# Patient Record
Sex: FEMALE | Race: OTHER | NOT HISPANIC OR LATINO
[De-identification: names, ages, dates, MRNs, and addresses within clinical notes are randomized per-mention and may not be internally consistent; named-entity substitution may affect disease eponyms.]

---

## 2018-05-09 PROBLEM — Z00.00 ENCOUNTER FOR PREVENTIVE HEALTH EXAMINATION: Status: ACTIVE | Noted: 2018-05-09

## 2018-05-17 ENCOUNTER — RECORD ABSTRACTING (OUTPATIENT)
Age: 53
End: 2018-05-17

## 2018-05-17 DIAGNOSIS — Z78.9 OTHER SPECIFIED HEALTH STATUS: ICD-10-CM

## 2018-05-17 DIAGNOSIS — Z80.8 FAMILY HISTORY OF MALIGNANT NEOPLASM OF OTHER ORGANS OR SYSTEMS: ICD-10-CM

## 2018-05-17 DIAGNOSIS — Z82.0 FAMILY HISTORY OF EPILEPSY AND OTHER DISEASES OF THE NERVOUS SYSTEM: ICD-10-CM

## 2018-05-17 RX ORDER — BACILLUS COAGULANS/INULIN 1B-250 MG
CAPSULE ORAL
Refills: 0 | Status: ACTIVE | COMMUNITY

## 2018-05-17 RX ORDER — METHYLDOPA/HYDROCHLOROTHIAZIDE 250MG-15MG
TABLET ORAL
Refills: 0 | Status: ACTIVE | COMMUNITY

## 2018-05-17 RX ORDER — ANASTROZOLE 1 MG/1
1 TABLET ORAL DAILY
Refills: 0 | Status: ACTIVE | COMMUNITY

## 2018-06-01 ENCOUNTER — APPOINTMENT (OUTPATIENT)
Dept: PLASTIC SURGERY | Facility: CLINIC | Age: 53
End: 2018-06-01
Payer: COMMERCIAL

## 2018-06-01 VITALS
SYSTOLIC BLOOD PRESSURE: 104 MMHG | RESPIRATION RATE: 20 BRPM | WEIGHT: 140 LBS | DIASTOLIC BLOOD PRESSURE: 71 MMHG | HEIGHT: 66 IN | BODY MASS INDEX: 22.5 KG/M2 | HEART RATE: 77 BPM | TEMPERATURE: 97.7 F

## 2018-06-01 DIAGNOSIS — Z87.891 PERSONAL HISTORY OF NICOTINE DEPENDENCE: ICD-10-CM

## 2018-06-01 PROCEDURE — 99215 OFFICE O/P EST HI 40 MIN: CPT

## 2018-08-01 ENCOUNTER — APPOINTMENT (OUTPATIENT)
Dept: BREAST CENTER | Facility: CLINIC | Age: 53
End: 2018-08-01
Payer: MEDICAID

## 2018-08-01 VITALS
DIASTOLIC BLOOD PRESSURE: 58 MMHG | WEIGHT: 138 LBS | BODY MASS INDEX: 22.99 KG/M2 | HEART RATE: 68 BPM | HEIGHT: 65 IN | SYSTOLIC BLOOD PRESSURE: 114 MMHG

## 2018-08-01 DIAGNOSIS — R92.8 OTHER ABNORMAL AND INCONCLUSIVE FINDINGS ON DIAGNOSTIC IMAGING OF BREAST: ICD-10-CM

## 2018-08-01 DIAGNOSIS — Z87.898 PERSONAL HISTORY OF OTHER SPECIFIED CONDITIONS: ICD-10-CM

## 2018-08-01 DIAGNOSIS — D23.9 OTHER BENIGN NEOPLASM OF SKIN, UNSPECIFIED: ICD-10-CM

## 2018-08-01 PROCEDURE — 99214 OFFICE O/P EST MOD 30 MIN: CPT

## 2018-08-14 ENCOUNTER — APPOINTMENT (OUTPATIENT)
Dept: PLASTIC SURGERY | Facility: CLINIC | Age: 53
End: 2018-08-14

## 2018-09-07 ENCOUNTER — APPOINTMENT (OUTPATIENT)
Dept: PLASTIC SURGERY | Facility: CLINIC | Age: 53
End: 2018-09-07
Payer: MEDICAID

## 2018-09-07 VITALS
HEART RATE: 78 BPM | OXYGEN SATURATION: 100 % | RESPIRATION RATE: 18 BRPM | HEIGHT: 65 IN | BODY MASS INDEX: 24.16 KG/M2 | TEMPERATURE: 98.1 F | WEIGHT: 145 LBS | DIASTOLIC BLOOD PRESSURE: 78 MMHG | SYSTOLIC BLOOD PRESSURE: 107 MMHG

## 2018-09-07 PROCEDURE — 99213 OFFICE O/P EST LOW 20 MIN: CPT

## 2018-09-12 ENCOUNTER — APPOINTMENT (OUTPATIENT)
Dept: PLASTIC SURGERY | Facility: HOSPITAL | Age: 53
End: 2018-09-12
Payer: COMMERCIAL

## 2018-09-12 PROCEDURE — 19380 REVJ RECONSTRUCTED BREAST: CPT | Mod: LT,59

## 2018-09-12 PROCEDURE — 19316 MASTOPEXY: CPT | Mod: LT

## 2018-09-12 PROCEDURE — 20926: CPT | Mod: 59

## 2018-09-18 ENCOUNTER — APPOINTMENT (OUTPATIENT)
Dept: PLASTIC SURGERY | Facility: CLINIC | Age: 53
End: 2018-09-18
Payer: MEDICAID

## 2018-09-18 VITALS
WEIGHT: 145 LBS | HEART RATE: 71 BPM | HEIGHT: 66 IN | RESPIRATION RATE: 20 BRPM | DIASTOLIC BLOOD PRESSURE: 74 MMHG | BODY MASS INDEX: 23.3 KG/M2 | OXYGEN SATURATION: 100 % | TEMPERATURE: 98.4 F | SYSTOLIC BLOOD PRESSURE: 119 MMHG

## 2018-09-18 PROCEDURE — 99024 POSTOP FOLLOW-UP VISIT: CPT

## 2018-10-16 ENCOUNTER — APPOINTMENT (OUTPATIENT)
Dept: PLASTIC SURGERY | Facility: CLINIC | Age: 53
End: 2018-10-16
Payer: MEDICAID

## 2018-10-16 VITALS
BODY MASS INDEX: 23.3 KG/M2 | DIASTOLIC BLOOD PRESSURE: 58 MMHG | HEIGHT: 66 IN | TEMPERATURE: 97.8 F | WEIGHT: 145 LBS | OXYGEN SATURATION: 99 % | SYSTOLIC BLOOD PRESSURE: 100 MMHG | HEART RATE: 67 BPM | RESPIRATION RATE: 18 BRPM

## 2018-10-16 PROCEDURE — 99024 POSTOP FOLLOW-UP VISIT: CPT

## 2018-11-14 ENCOUNTER — APPOINTMENT (OUTPATIENT)
Dept: BREAST CENTER | Facility: CLINIC | Age: 53
End: 2018-11-14
Payer: COMMERCIAL

## 2018-11-14 VITALS
BODY MASS INDEX: 23.9 KG/M2 | HEIGHT: 64 IN | SYSTOLIC BLOOD PRESSURE: 101 MMHG | HEART RATE: 71 BPM | WEIGHT: 140 LBS | DIASTOLIC BLOOD PRESSURE: 57 MMHG

## 2018-11-14 DIAGNOSIS — M65.311 TRIGGER THUMB, RIGHT THUMB: ICD-10-CM

## 2018-11-14 PROCEDURE — 99214 OFFICE O/P EST MOD 30 MIN: CPT

## 2018-11-14 RX ORDER — ASPIRIN 81 MG
81 TABLET, DELAYED RELEASE (ENTERIC COATED) ORAL
Refills: 0 | Status: ACTIVE | COMMUNITY

## 2018-11-14 RX ORDER — LEUPROLIDE ACETATE 7.5 MG
KIT INTRAMUSCULAR
Refills: 0 | Status: ACTIVE | COMMUNITY

## 2019-04-11 ENCOUNTER — APPOINTMENT (OUTPATIENT)
Dept: BREAST CENTER | Facility: CLINIC | Age: 54
End: 2019-04-11
Payer: COMMERCIAL

## 2019-04-11 VITALS
DIASTOLIC BLOOD PRESSURE: 71 MMHG | BODY MASS INDEX: 25.61 KG/M2 | HEART RATE: 58 BPM | WEIGHT: 150 LBS | SYSTOLIC BLOOD PRESSURE: 119 MMHG | HEIGHT: 64 IN

## 2019-04-11 PROCEDURE — 99214 OFFICE O/P EST MOD 30 MIN: CPT

## 2019-04-11 NOTE — PHYSICAL EXAM
[Normocephalic] : normocephalic [Atraumatic] : atraumatic [Supple] : supple [No Supraclavicular Adenopathy] : no supraclavicular adenopathy [No Cervical Adenopathy] : no cervical adenopathy [No Thyromegaly] : no thyromegaly [Normal Sinus Rhythm] : normal sinus rhythm [Examined in the supine and seated position] : examined in the supine and seated position [No dominant masses] : no dominant masses in right breast  [No dominant masses] : no dominant masses left breast [No Nipple Retraction] : no left nipple retraction [No Nipple Discharge] : no left nipple discharge [No Axillary Lymphadenopathy] : no left axillary lymphadenopathy [No Edema] : no edema [No Rashes] : no rashes [No Ulceration] : no ulceration [de-identified] : S/P NSMX/Implt w/o rec. %. No lymphedema. \par   [de-identified] : S/P NSMX/SLN/IAx/PMRT/Implt w/o rec. %. No lymphedema. \par

## 2019-04-11 NOTE — HISTORY OF PRESENT ILLNESS
[FreeTextEntry1] : S/P Bilat NSMX/ R SLN/ R AX/TE's ([facility])(10/10/16): R: +ILCA x 2 sites (2.5cm, 0.8cm), +LVI, +3/10 LN, +extranodal extension, -margins, ER+, [place]+, Her2 1+, Ki67 29%/16%, L: No Ca/atypia\par R Stage IIB (T2N1M0) ILCA\par Completed chemo (AC/T)([facility])(3/17)\par S/P R PMRT ([facility])\par S/P Bilat Implt Xchg (1/12/18)([facility])\par S/P L SIP (11/14/16)([facility]) > d/c'ed (4/17)\par Started Arimidex/Lupron (8/17)\par On Zometa q6mos\par +FH Br Ca (M. Gr Aunt 40)\par BRCA ([facility])(8/16): -\par +c/o bilat trigger finger in thumbs (R>L) > s/p steroid inj R and L\par S/P L and R thumb surgery for trigger finger release\par S/P L Mx Site revision/fat injection (9/18)([facility])\par No other MH/FH changes. ROS reviewed/discussed. Taking Vit D. Bone Densitometry (7/17): GEE

## 2019-09-12 ENCOUNTER — APPOINTMENT (OUTPATIENT)
Dept: BREAST CENTER | Facility: CLINIC | Age: 54
End: 2019-09-12
Payer: MEDICAID

## 2019-09-12 VITALS
BODY MASS INDEX: 25.61 KG/M2 | DIASTOLIC BLOOD PRESSURE: 71 MMHG | SYSTOLIC BLOOD PRESSURE: 107 MMHG | WEIGHT: 150 LBS | HEART RATE: 62 BPM | HEIGHT: 64 IN

## 2019-09-12 PROCEDURE — 99214 OFFICE O/P EST MOD 30 MIN: CPT

## 2019-09-12 NOTE — HISTORY OF PRESENT ILLNESS
[FreeTextEntry1] : S/P Bilat NSMX/ R SLN/ R Ax/TE's ([facility])(10/10/16): R: +ILCA x 2 sites (2.5cm, 0.8cm), +LVI, +3/10 LN, +extranodal extension, -margins, ER+, [place]+, Her2 1+, Ki67 29%/16%, L: No Ca/atypia\par R Stage IIB (T2N1M0) ILCA\par Completed chemo (AC/T)([facility])(3/17)\par S/P R PMRT ([facility])\par S/P Bilat Implt Xchg (1/12/18)([facility])\par S/P L SIP (11/14/16)([facility]) > d/c'ed (4/17)\par Started Arimidex/Lupron (8/17)\par On Zometa q6mos\par +FH Br Ca (M. Gr Aunt 40)\par BRCA ([facility])(8/16): -\par +c/o bilat trigger finger in thumbs (R>L) > s/p steroid inj R and L\par S/P L and R thumb surgery for trigger finger release\par S/P L Mx Site revision/fat injection (9/18)([facility])\par No other MH/FH changes. ROS reviewed/discussed. Taking Vit D. Bone Densitometry (7/17): GEE

## 2019-09-12 NOTE — PHYSICAL EXAM
[Normocephalic] : normocephalic [Atraumatic] : atraumatic [No Cervical Adenopathy] : no cervical adenopathy [No Supraclavicular Adenopathy] : no supraclavicular adenopathy [Supple] : supple [No Thyromegaly] : no thyromegaly [Normal Sinus Rhythm] : normal sinus rhythm [Examined in the supine and seated position] : examined in the supine and seated position [No dominant masses] : no dominant masses in right breast  [No dominant masses] : no dominant masses left breast [No Nipple Retraction] : no right nipple retraction [No Nipple Discharge] : no right nipple discharge [No Axillary Lymphadenopathy] : no right axillary lymphadenopathy [No Edema] : no edema [No Rashes] : no rashes [No Ulceration] : no ulceration [de-identified] : S/P NSMX/SLN/Ax/Implt w/o rec. %. No lymphedema. \par +caps contr.\par   [de-identified] : S/P NSMX/Implt w/o rec. %. No lymphedema. \par

## 2019-10-15 ENCOUNTER — APPOINTMENT (OUTPATIENT)
Dept: PLASTIC SURGERY | Facility: CLINIC | Age: 54
End: 2019-10-15
Payer: COMMERCIAL

## 2019-10-15 VITALS
BODY MASS INDEX: 23.63 KG/M2 | OXYGEN SATURATION: 100 % | TEMPERATURE: 98.8 F | RESPIRATION RATE: 20 BRPM | SYSTOLIC BLOOD PRESSURE: 112 MMHG | HEIGHT: 66 IN | WEIGHT: 147 LBS | DIASTOLIC BLOOD PRESSURE: 68 MMHG | HEART RATE: 79 BPM

## 2019-10-15 PROCEDURE — 99214 OFFICE O/P EST MOD 30 MIN: CPT

## 2019-10-15 NOTE — HISTORY OF PRESENT ILLNESS
[FreeTextEntry1] : pt is s/p r breast cancer bilateral mastectomy with rt to r side with change of implants 2018 for [facility]+  textured.  pt already aware of reports of alcl prior to implantation she denies change in status and notes firm contracture r side .  pt has no evidence of  fluid or other alarming issue aside from radiation induced contracture   she sees dr tapia on a regular basis and sees me annually  disc ultrasound periodically for surveillance  do not recommend removal and pt aware of ALCL

## 2019-10-15 NOTE — ASSESSMENT
[FreeTextEntry1] : pt with excellent shape and symmetry with minor issues of irregular shape but overall excellent appearance after bilateral mastectomy nipple sparing  rto annually for check  pt does not want to electively change out textured for smooth as she was aware preoperatively. she will rto prn change sooner than 1 year

## 2019-10-15 NOTE — PHYSICAL EXAM
[NI] : Normal [de-identified] : Status post bilateral mastectomy and implant reconstruction, right side has a grade 2 contracture and the left side is softer and lower with rippling laterally

## 2020-01-27 ENCOUNTER — APPOINTMENT (OUTPATIENT)
Dept: BREAST CENTER | Facility: CLINIC | Age: 55
End: 2020-01-27
Payer: MEDICAID

## 2020-01-27 VITALS
HEIGHT: 65 IN | HEART RATE: 70 BPM | SYSTOLIC BLOOD PRESSURE: 101 MMHG | BODY MASS INDEX: 31.32 KG/M2 | DIASTOLIC BLOOD PRESSURE: 64 MMHG | WEIGHT: 188 LBS

## 2020-01-27 PROCEDURE — 99214 OFFICE O/P EST MOD 30 MIN: CPT

## 2020-01-27 RX ORDER — OXYCODONE AND ACETAMINOPHEN 5; 325 MG/1; MG/1
5-325 TABLET ORAL
Qty: 40 | Refills: 0 | Status: DISCONTINUED | COMMUNITY
Start: 2018-01-12 | End: 2020-01-27

## 2020-01-27 RX ORDER — CEPHALEXIN 500 MG/1
500 CAPSULE ORAL
Qty: 20 | Refills: 0 | Status: COMPLETED | COMMUNITY
Start: 2018-01-12 | End: 2020-01-27

## 2020-01-27 RX ORDER — AMOXICILLIN AND CLAVULANATE POTASSIUM 875; 125 MG/1; MG/1
875-125 TABLET, COATED ORAL
Qty: 20 | Refills: 0 | Status: COMPLETED | COMMUNITY
Start: 2018-03-16 | End: 2020-01-27

## 2020-01-27 RX ORDER — AMOXICILLIN 500 MG/1
500 CAPSULE ORAL
Refills: 0 | Status: COMPLETED | COMMUNITY
End: 2020-01-27

## 2020-01-27 RX ORDER — GRAPE SEED OIL 100 %
OIL (ML) MISCELLANEOUS
Refills: 0 | Status: DISCONTINUED | COMMUNITY
End: 2020-01-27

## 2020-01-27 NOTE — PHYSICAL EXAM
[Normocephalic] : normocephalic [Atraumatic] : atraumatic [Supple] : supple [No Supraclavicular Adenopathy] : no supraclavicular adenopathy [No Cervical Adenopathy] : no cervical adenopathy [No Thyromegaly] : no thyromegaly [Normal Sinus Rhythm] : normal sinus rhythm [Examined in the supine and seated position] : examined in the supine and seated position [No dominant masses] : no dominant masses in right breast  [No dominant masses] : no dominant masses left breast [No Nipple Retraction] : no left nipple retraction [No Nipple Discharge] : no left nipple discharge [No Axillary Lymphadenopathy] : no left axillary lymphadenopathy [No Edema] : no edema [No Rashes] : no rashes [No Ulceration] : no ulceration [de-identified] : S/P NSMX/SLN/Ax/PMRT/Implt w/o rec. %. No lymphedema. \par +caps contr\par   [de-identified] : S/P NSMX/Implt w/o susp fx's. %. No lymphedema. \par

## 2020-01-27 NOTE — HISTORY OF PRESENT ILLNESS
[FreeTextEntry1] : S/P Bilat NSMX/ R SLN/ R Ax/TE's ([facility])(10/10/16): R: +ILCA x 2 sites (2.5cm, 0.8cm), +LVI, +3/10 LN, +extranodal extension, -margins, ER+, [place]+, Her2 1+, Ki67 29%/16%, L: No Ca/atypia\par R Stage IIB (T2N1M0) ILCA\par Completed chemo (AC/T)([facility])(3/17)\par S/P R PMRT ([facility])\par S/P Bilat Implt Xchg (1/12/18)([facility])\par S/P L SIP (11/14/16)([facility]) > d/c'ed (4/17)\par Started Arimidex/Lupron (8/17)\par On Zometa q6mos\par +FH Br Ca (M. Gr Aunt 38)\par BRCA ([facility])(8/16): -\par +c/o bilat trigger finger in thumbs (R>L) > s/p steroid inj R and L\par S/P L and R thumb surgery for trigger finger release\par S/P L Mx Site revision/fat injection (9/18)([facility])\par No other MH/FH changes. ROS reviewed/discussed. Taking Vit D. Bone Densitometry (7/17): GEE

## 2020-05-20 ENCOUNTER — APPOINTMENT (OUTPATIENT)
Dept: BREAST CENTER | Facility: CLINIC | Age: 55
End: 2020-05-20
Payer: MEDICAID

## 2020-05-20 VITALS — DIASTOLIC BLOOD PRESSURE: 69 MMHG | HEIGHT: 66 IN | SYSTOLIC BLOOD PRESSURE: 123 MMHG | HEART RATE: 63 BPM

## 2020-05-20 PROCEDURE — 99214 OFFICE O/P EST MOD 30 MIN: CPT

## 2020-05-20 NOTE — HISTORY OF PRESENT ILLNESS
[FreeTextEntry1] : S/P Bilat NSMX/ R SLN/ R Ax/TE's ([facility])(10/10/16): R: +ILCA x 2 sites (2.5cm, 0.8cm), +LVI, +3/10 LN, +extranodal extension, -margins, ER+, [place]+, Her2 1+, Ki67 29%/16%, L: No Ca/atypia\par R Stage IIB (T2N1M0) ILCA\par Completed chemo (AC/T)([facility])(3/17)\par S/P R PMRT ([facility])\par S/P Bilat Implt Xchg (textured > NOT Allerghan) (1/12/18)([facility])\par S/P L SIP (11/14/16)([facility]) > d/c'ed (4/17)\par Started Arimidex/Lupron (8/17)\par On Zometa q6mos\par +FH Br Ca (M. Gr Aunt 38)\par BRCA ([facility])(8/16): -\par +c/o bilat trigger finger in thumbs (R>L) > s/p steroid inj R and L\par S/P L and R thumb surgery for trigger finger release\par S/P L Mx Site revision/fat injection (9/18)([facility])\par No other MH/FH changes. ROS reviewed/discussed. Taking Vit D. Bone Densitometry (7/17): GEE

## 2020-05-20 NOTE — PHYSICAL EXAM
[Atraumatic] : atraumatic [Normocephalic] : normocephalic [Supple] : supple [No Supraclavicular Adenopathy] : no supraclavicular adenopathy [No Cervical Adenopathy] : no cervical adenopathy [No Thyromegaly] : no thyromegaly [Normal Sinus Rhythm] : normal sinus rhythm [No dominant masses] : no dominant masses in right breast  [Examined in the supine and seated position] : examined in the supine and seated position [No Nipple Retraction] : no left nipple retraction [No dominant masses] : no dominant masses left breast [No Nipple Discharge] : no right nipple discharge [No Axillary Lymphadenopathy] : no right axillary lymphadenopathy [No Edema] : no edema [No Ulceration] : no ulceration [No Rashes] : no rashes [de-identified] : S/P NSMX/SLN/Ax/Implt w/o rec. %. No lymphedema. \par   [de-identified] : S/P NSMX/Implt w/o susp fx's. %. No lymphedema. \par

## 2020-10-20 ENCOUNTER — APPOINTMENT (OUTPATIENT)
Dept: PLASTIC SURGERY | Facility: CLINIC | Age: 55
End: 2020-10-20
Payer: COMMERCIAL

## 2020-10-20 ENCOUNTER — APPOINTMENT (OUTPATIENT)
Dept: BREAST CENTER | Facility: CLINIC | Age: 55
End: 2020-10-20
Payer: MEDICAID

## 2020-10-20 VITALS
HEART RATE: 64 BPM | OXYGEN SATURATION: 98 % | TEMPERATURE: 98.4 F | SYSTOLIC BLOOD PRESSURE: 113 MMHG | DIASTOLIC BLOOD PRESSURE: 74 MMHG | RESPIRATION RATE: 16 BRPM

## 2020-10-20 VITALS
HEART RATE: 68 BPM | DIASTOLIC BLOOD PRESSURE: 74 MMHG | WEIGHT: 150 LBS | HEIGHT: 65 IN | BODY MASS INDEX: 24.99 KG/M2 | SYSTOLIC BLOOD PRESSURE: 114 MMHG

## 2020-10-20 DIAGNOSIS — Z85.3 PERSONAL HISTORY OF MALIGNANT NEOPLASM OF BREAST: ICD-10-CM

## 2020-10-20 PROCEDURE — 99214 OFFICE O/P EST MOD 30 MIN: CPT

## 2020-10-20 PROCEDURE — 99072 ADDL SUPL MATRL&STAF TM PHE: CPT

## 2020-10-20 NOTE — PHYSICAL EXAM
[Normocephalic] : normocephalic [Atraumatic] : atraumatic [Supple] : supple [No Supraclavicular Adenopathy] : no supraclavicular adenopathy [No Cervical Adenopathy] : no cervical adenopathy [No Thyromegaly] : no thyromegaly [Normal Sinus Rhythm] : normal sinus rhythm [Examined in the supine and seated position] : examined in the supine and seated position [No dominant masses] : no dominant masses in right breast  [No dominant masses] : no dominant masses left breast [No Nipple Retraction] : no left nipple retraction [No Nipple Discharge] : no left nipple discharge [No Axillary Lymphadenopathy] : no left axillary lymphadenopathy [No Edema] : no edema [No Rashes] : no rashes [No Ulceration] : no ulceration [de-identified] : S/P NSMX/SLN/Ax/PMRT/Implt w/o rec. %. No lymphedema. \par +caps contracture\par   [de-identified] : S/P NSMX/Implt w/o susp fx's. %. No lymphedema. \par

## 2020-10-20 NOTE — HISTORY OF PRESENT ILLNESS
[FreeTextEntry1] : S/P Bilat NSMX/ R SLN/ R Ax/TE's ([facility])(10/10/16): R: +ILCA x 2 sites (2.5cm, 0.8cm), +LVI, +3/10 LN, +extranodal extension, -margins, ER+, [place]+, Her2 1+, Ki67 29%/16%, L: No Ca/atypia\par R Stage IIB (T2N1M0) ILCA\par Completed chemo (AC/T)([facility])(3/17)\par S/P R PMRT ([facility])\par S/P Bilat Implt Xchg (textured > NOT Allerghan) (1/12/18)([facility])\par S/P L SIP (11/14/16)([facility]) > d/c'ed (4/17)\par Started Arimidex/Lupron/ASAb (8/17)\par On Zometa q6mos\par +FH Br Ca (M. Gr Aunt 38)\par BRCA ([facility])(8/16): -\par +c/o bilat trigger finger in thumbs (R>L) > s/p steroid inj R and L\par S/P L and R thumb surgery for trigger finger release\par S/P L Mx Site revision/fat injection (9/18)([facility])\par No other MH/FH changes. ROS reviewed/discussed. Taking Vit D. Bone Densitometry (7/17): GEE

## 2020-12-23 PROBLEM — Z85.3 HISTORY OF MALIGNANT NEOPLASM OF BREAST: Status: RESOLVED | Noted: 2018-05-17 | Resolved: 2020-12-23

## 2020-12-23 NOTE — PHYSICAL EXAM
[NI] : Normal [de-identified] : Status post bilateral mastectomy and implant reconstruction, right side has a grade 2 contracture and the left side is softer and lower with rippling laterally

## 2020-12-23 NOTE — HISTORY OF PRESENT ILLNESS
[FreeTextEntry1] : pt is here for annual visit after bilateral mastectomy reconstruction with implants  . no interval illness or issues . pt has adjusted to radiation contracture well and it has not progressed.

## 2020-12-23 NOTE — ASSESSMENT
[FreeTextEntry1] : pt is doing well with no recurrence and no change with grade 2 contracture on radiated side  grade 1 on contralateral side .  monitoring textured implants The risks, benefits, alternatives, limitations and the permanent scars were outlined with the patient.\par RAKESH will return to the office for a post procedure visit\par The instructions were reviewed in detail with RAKESH.\par

## 2021-02-08 ENCOUNTER — APPOINTMENT (OUTPATIENT)
Dept: BREAST CENTER | Facility: CLINIC | Age: 56
End: 2021-02-08
Payer: MEDICAID

## 2021-02-08 VITALS — BODY MASS INDEX: 24.11 KG/M2 | HEIGHT: 66 IN | WEIGHT: 150 LBS

## 2021-02-08 PROCEDURE — 99214 OFFICE O/P EST MOD 30 MIN: CPT

## 2021-02-08 PROCEDURE — 99072 ADDL SUPL MATRL&STAF TM PHE: CPT

## 2021-02-08 NOTE — HISTORY OF PRESENT ILLNESS
[FreeTextEntry1] : S/P Bilat NSMX/ R SLN/ R Ax/TE's ([facility])(10/10/16): R: +ILCA x 2 sites (2.5cm, 0.8cm), +LVI, +3/10 LN, +extranodal extension, -margins, ER+, [place]+, Her2 1+, Ki67 29%/16%, L: No Ca/atypia\par R Stage IIB (T2N1M0) ILCA\par Completed chemo (AC/T)([facility])(3/17)\par S/P R PMRT ([facility])\par S/P Bilat Implt Xchg (textured > NOT Allerghan) (1/12/18)([facility])\par S/P L SIP (11/14/16)([facility]) > d/c'ed (4/17)\par Started Arimidex/Lupron/ASAb (8/17)\par On Zometa q6mos\par +FH Br Ca (M. Gr Aunt 38)\par BRCA ([facility])(8/16): -\par +c/o bilat trigger finger in thumbs (R>L) > s/p steroid inj R and L\par S/P L and R thumb surgery for trigger finger release\par S/P L Mx Site revision/fat injection (9/18)([facility])\par +c/o R hand "pins and needles" x 1 mo > improving > observe\par No other MH/FH changes. ROS reviewed/discussed. Taking Vit D. Bone Densitometry (7/17): GEE

## 2021-02-08 NOTE — PHYSICAL EXAM
[Normocephalic] : normocephalic [Atraumatic] : atraumatic [Supple] : supple [No Supraclavicular Adenopathy] : no supraclavicular adenopathy [No Cervical Adenopathy] : no cervical adenopathy [No Thyromegaly] : no thyromegaly [Normal Sinus Rhythm] : normal sinus rhythm [Examined in the supine and seated position] : examined in the supine and seated position [No dominant masses] : no dominant masses in right breast  [No dominant masses] : no dominant masses left breast [No Nipple Retraction] : no left nipple retraction [No Nipple Discharge] : no left nipple discharge [No Axillary Lymphadenopathy] : no left axillary lymphadenopathy [No Edema] : no edema [No Rashes] : no rashes [No Ulceration] : no ulceration [de-identified] : S/P NSMX/SLN/Ax/PMRT/Implt w/o rec. %. No lymphedema. \par +capsular contracture\par   [de-identified] : S/P NSMX/Implant w/o susp fx's. %. No lymphedema \par +capsular contracture

## 2021-07-01 ENCOUNTER — APPOINTMENT (OUTPATIENT)
Dept: BREAST CENTER | Facility: CLINIC | Age: 56
End: 2021-07-01

## 2021-09-16 ENCOUNTER — NON-APPOINTMENT (OUTPATIENT)
Age: 56
End: 2021-09-16

## 2021-09-16 ENCOUNTER — APPOINTMENT (OUTPATIENT)
Dept: BREAST CENTER | Facility: CLINIC | Age: 56
End: 2021-09-16
Payer: MEDICAID

## 2021-09-16 VITALS
DIASTOLIC BLOOD PRESSURE: 63 MMHG | HEIGHT: 65 IN | WEIGHT: 150 LBS | SYSTOLIC BLOOD PRESSURE: 100 MMHG | BODY MASS INDEX: 24.99 KG/M2 | HEART RATE: 74 BPM

## 2021-09-16 PROCEDURE — 99214 OFFICE O/P EST MOD 30 MIN: CPT

## 2021-09-16 NOTE — PHYSICAL EXAM
[Normocephalic] : normocephalic [Atraumatic] : atraumatic [Supple] : supple [No Supraclavicular Adenopathy] : no supraclavicular adenopathy [No Cervical Adenopathy] : no cervical adenopathy [No Thyromegaly] : no thyromegaly [Normal Sinus Rhythm] : normal sinus rhythm [Examined in the supine and seated position] : examined in the supine and seated position [No dominant masses] : no dominant masses in right breast  [No dominant masses] : no dominant masses left breast [No Nipple Retraction] : no left nipple retraction [No Nipple Discharge] : no left nipple discharge [No Axillary Lymphadenopathy] : no left axillary lymphadenopathy [No Edema] : no edema [No Rashes] : no rashes [No Ulceration] : no ulceration [de-identified] : S/P NSMX/SLN/Ax/PMRT/Implt w/o rec. %. No lymphedema. \par +capsular contracture\par   [de-identified] : S/P NSMX/Implant w/o susp fx's. %. No lymphedema

## 2021-09-16 NOTE — HISTORY OF PRESENT ILLNESS
[FreeTextEntry1] : S/P Bilat NSMX/ R SLN/ R Ax/TE's ([facility])(10/10/16): R: +ILCA x 2 sites (2.5cm, 0.8cm), +LVI, +3/10 LN, +extranodal extension, -margins, ER+, [place]+, Her2 1+, Ki67 29%/16%, L: No Ca/atypia\par R Stage IIB (T2N1M0) ILCA\par Completed chemo (AC/T)([facility])(3/17)\par S/P R PMRT ([facility])\par S/P Bilat Implt Xchg (textured > NOT Allerghan) (1/12/18)([facility])\par S/P L SIP (11/14/16)([facility]) > d/c'ed (4/17)\par Started Arimidex/Lupron/ASAb (8/17)\par On Zometa q6mos\par +FH Br Ca (M. Gr Aunt 38)\par BRCA ([facility])(8/16): -\par +c/o bilat trigger finger in thumbs (R>L) > s/p steroid inj R and L\par S/P L and R thumb surgery for trigger finger release\par S/P L Mx Site revision/fat injection (9/18)([facility])\par Got second Pfizer (5/21)([facility])\par No other MH/FH changes. ROS reviewed/discussed. Taking Vit D. Bone Densitometry (7/17): GEE

## 2021-10-26 ENCOUNTER — APPOINTMENT (OUTPATIENT)
Dept: PLASTIC SURGERY | Facility: CLINIC | Age: 56
End: 2021-10-26

## 2021-12-28 ENCOUNTER — APPOINTMENT (OUTPATIENT)
Dept: PLASTIC SURGERY | Facility: CLINIC | Age: 56
End: 2021-12-28
Payer: MEDICAID

## 2021-12-28 VITALS
SYSTOLIC BLOOD PRESSURE: 112 MMHG | HEART RATE: 76 BPM | RESPIRATION RATE: 20 BRPM | TEMPERATURE: 97.9 F | DIASTOLIC BLOOD PRESSURE: 67 MMHG | OXYGEN SATURATION: 99 %

## 2021-12-28 PROCEDURE — 99213 OFFICE O/P EST LOW 20 MIN: CPT

## 2021-12-28 NOTE — HISTORY OF PRESENT ILLNESS
[FreeTextEntry1] : annual visit with gel implants bilaterally after double mastectomy and radiation to r side .  pt has good shape and symmetry no mass and is followed closely by oncology and breast surgery .

## 2021-12-28 NOTE — PHYSICAL EXAM
[NI] : Normal [de-identified] : Status post bilateral mastectomy and implant reconstruction, right side has a grade 2 contracture and the left side is softer and lower with rippling laterally

## 2021-12-28 NOTE — ASSESSMENT
[FreeTextEntry1] : pt is doing well and has no evidence of recurrence  she has a stable contracture and is happy at this point .  The instructions were reviewed in detail with RAKESH. All of RAKESH 's questions were answered completely RAKESH will return to the office for a post procedure visit annually  no

## 2022-03-16 ENCOUNTER — APPOINTMENT (OUTPATIENT)
Dept: BREAST CENTER | Facility: CLINIC | Age: 57
End: 2022-03-16

## 2022-08-31 ENCOUNTER — APPOINTMENT (OUTPATIENT)
Dept: BREAST CENTER | Facility: CLINIC | Age: 57
End: 2022-08-31

## 2022-08-31 VITALS
BODY MASS INDEX: 24.99 KG/M2 | DIASTOLIC BLOOD PRESSURE: 66 MMHG | HEART RATE: 64 BPM | HEIGHT: 65 IN | WEIGHT: 150 LBS | SYSTOLIC BLOOD PRESSURE: 103 MMHG

## 2022-08-31 DIAGNOSIS — Z23 ENCOUNTER FOR IMMUNIZATION: ICD-10-CM

## 2022-08-31 DIAGNOSIS — D05.11 INTRADUCTAL CARCINOMA IN SITU OF RIGHT BREAST: ICD-10-CM

## 2022-08-31 PROCEDURE — 99214 OFFICE O/P EST MOD 30 MIN: CPT

## 2022-08-31 NOTE — HISTORY OF PRESENT ILLNESS
[FreeTextEntry1] : S/P Bilat NSMX/ R SLN/ R Ax/TE's ([facility])(10/10/16): R: +ILCA x 2 sites (2.5cm, 0.8cm), +LVI, +3/10 LN, +extranodal extension, -margins, ER+, [place]+, Her2 1+, Ki67 29%/16%, L: No Ca/atypia\par R Stage IIB (T2N1M0) ILCA\par Completed chemo (AC/T)([facility])(3/17)\par S/P R PMRT ([facility])\par S/P Bilat Implt Xchg (textured > NOT Allerghan) (1/12/18)([facility])\par S/P L SIP (11/14/16)([facility]) > d/c'ed (4/17)\par Started Arimidex/Lupron/ASAb (8/17)\par On Zometa q6mos\par +FH Br Ca (M. Gr Aunt 38)\par BRCA ([facility])(8/16): -\par +c/o bilat trigger finger in thumbs (R>L) > s/p steroid inj R and L\par S/P L and R thumb surgery for trigger finger release\par S/P L Mx Site revision/fat injection (9/18[facility])\par Got second Pfizer (5/21)([facility])\par No other MH/FH changes. ROS reviewed/discussed. Taking Vit D. Bone Densitometry (7/17): GEE

## 2022-08-31 NOTE — PHYSICAL EXAM
[Normocephalic] : normocephalic [Atraumatic] : atraumatic [Supple] : supple [No Supraclavicular Adenopathy] : no supraclavicular adenopathy [No Cervical Adenopathy] : no cervical adenopathy [No Thyromegaly] : no thyromegaly [Normal Sinus Rhythm] : normal sinus rhythm [Examined in the supine and seated position] : examined in the supine and seated position [No dominant masses] : no dominant masses in right breast  [No dominant masses] : no dominant masses left breast [No Nipple Retraction] : no left nipple retraction [No Nipple Discharge] : no left nipple discharge [No Axillary Lymphadenopathy] : no left axillary lymphadenopathy [No Edema] : no edema [No Rashes] : no rashes [No Ulceration] : no ulceration [de-identified] : S/P NSMX/Ax/PMRT/Implt w/o rec. %. No lymphedema. \par   [de-identified] : S/P NSMX/Implant w/o susp fx's. %. No lymphedema

## 2022-11-15 ENCOUNTER — APPOINTMENT (OUTPATIENT)
Dept: PLASTIC SURGERY | Facility: CLINIC | Age: 57
End: 2022-11-15
Payer: MEDICAID

## 2022-11-15 VITALS
DIASTOLIC BLOOD PRESSURE: 71 MMHG | HEART RATE: 89 BPM | OXYGEN SATURATION: 100 % | SYSTOLIC BLOOD PRESSURE: 111 MMHG | RESPIRATION RATE: 20 BRPM

## 2022-11-15 PROCEDURE — 99213 OFFICE O/P EST LOW 20 MIN: CPT

## 2022-12-19 NOTE — ASSESSMENT
[FreeTextEntry1] : RAKESH has no evidence of recurrence  and has a stable reconstruction despite having radiation damage to the r side in particular .  there is a stable contracture no mass and skin and soft tissues have minimal radiation effects

## 2022-12-19 NOTE — HISTORY OF PRESENT ILLNESS
[FreeTextEntry1] : RAKESH had bilateral mastectomy for unilateral cancer of the right breast with post op radiation therapy .  she has a contracture but is stable  .  she is here for annual check with no interval changes

## 2022-12-19 NOTE — PHYSICAL EXAM
[NI] : Normal [de-identified] : Status post bilateral mastectomy and implant reconstruction, right side has a grade 2 contracture and the left side is softer and lower with rippling laterally

## 2023-03-01 ENCOUNTER — APPOINTMENT (OUTPATIENT)
Dept: BREAST CENTER | Facility: CLINIC | Age: 58
End: 2023-03-01
Payer: MEDICAID

## 2023-03-01 VITALS
HEIGHT: 65 IN | SYSTOLIC BLOOD PRESSURE: 122 MMHG | DIASTOLIC BLOOD PRESSURE: 78 MMHG | WEIGHT: 150 LBS | HEART RATE: 81 BPM | BODY MASS INDEX: 24.99 KG/M2

## 2023-03-01 PROCEDURE — 99214 OFFICE O/P EST MOD 30 MIN: CPT

## 2023-03-01 NOTE — HISTORY OF PRESENT ILLNESS
[FreeTextEntry1] : S/P Bilat NSMX/ R SLN/ R Ax/TE's ([facility])(10/10/16): R: +ILCA x 2 sites (2.5cm, 0.8cm), +LVI, +3/10 LN, +extranodal extension, -margins, ER+, [place]+, Her2 1+, Ki67 29%/16%, L: No Ca/atypia\par R Stage IIB (T2N1M0) ILCA\par Completed chemo (AC/T)([facility])(3/17)\par S/P R PMRT ([facility])\par S/P Bilat Implt Xchg (textured > NOT Allerghan) (1/12/18)([facility])\par S/P L SIP (11/14/16)([facility]) > d/c'ed (4/17)\par Started Arimidex/Lupron/ASAb (8/17)\par On Zometa q6mos\par +FH Br Ca (M. Gr Aunt 38)\par BRCA ([facility])(8/16): -\par +c/o bilat trigger finger in thumbs (R>L) > s/p steroid inj R and L\par S/P L and R thumb surgery for trigger finger release\par S/P L Mx Site revision/fat injection (9/18)([facility])\par Colonoscopy(1/23): "WNL" > 10yrs \par PAP/Pelvic (10/22): +HPV > +Cx Bx > "OK" > F/U PAP 4/23) \par Got Pfizer bivalent (10/22)\par No other MH/FH changes. ROS reviewed/discussed. Taking Vit D. Bone Densitometry (7/17): WNL

## 2023-03-01 NOTE — PHYSICAL EXAM
[Normocephalic] : normocephalic [Atraumatic] : atraumatic [Supple] : supple [No Supraclavicular Adenopathy] : no supraclavicular adenopathy [No Cervical Adenopathy] : no cervical adenopathy [No Thyromegaly] : no thyromegaly [Normal Sinus Rhythm] : normal sinus rhythm [Examined in the supine and seated position] : examined in the supine and seated position [No dominant masses] : no dominant masses in right breast  [No dominant masses] : no dominant masses left breast [No Nipple Retraction] : no left nipple retraction [No Nipple Discharge] : no left nipple discharge [No Axillary Lymphadenopathy] : no left axillary lymphadenopathy [No Edema] : no edema [No Rashes] : no rashes [No Ulceration] : no ulceration [de-identified] : S/P NSMX/Ax/PMRT/Implt w/o rec. %. No lymphedema. \par +capsular contr\par   [de-identified] : S/P NSMX/Implant w/o susp fx's. %. No lymphedema

## 2023-07-05 ENCOUNTER — APPOINTMENT (OUTPATIENT)
Dept: BREAST CENTER | Facility: CLINIC | Age: 58
End: 2023-07-05
Payer: MEDICAID

## 2023-07-05 VITALS — HEART RATE: 86 BPM | DIASTOLIC BLOOD PRESSURE: 66 MMHG | HEIGHT: 65 IN | SYSTOLIC BLOOD PRESSURE: 113 MMHG

## 2023-07-05 PROCEDURE — 99214 OFFICE O/P EST MOD 30 MIN: CPT

## 2023-07-05 RX ORDER — DOXYCYCLINE HYCLATE 100 MG/1
100 TABLET ORAL
Qty: 20 | Refills: 0 | Status: ACTIVE | COMMUNITY
Start: 2023-01-19

## 2023-07-05 RX ORDER — PREDNISONE 10 MG/1
10 TABLET ORAL
Qty: 28 | Refills: 0 | Status: ACTIVE | COMMUNITY
Start: 2023-02-02

## 2023-07-05 NOTE — HISTORY OF PRESENT ILLNESS
[FreeTextEntry1] : Pt returns early w/ c/o new focal prominence R inf Br SD 1 wk ago.\par Pt has been doing upper body stretching exercises \par S/P Bilat NSMX/ R SLN/ R Ax/TE's ([facility])(10/10/16): R: +ILCA x 2 sites (2.5cm, 0.8cm), +LVI, +3/10 LN, +extranodal extension, -margins, ER+, [place]+, Her2 1+, Ki67 29%/16%, L: No Ca/atypia\par R Stage IIB (T2N1M0) ILCA\par Completed chemo (AC/T)([facility])(3/17)\par S/P R PMRT ([facility])\par S/P Bilat Implt Xchg (textured > NOT Allerghan) (1/12/18)([facility])\par S/P L SIP (11/14/16)([facility]) > d/c'ed (4/17)\par Started Arimidex/Lupron/ASAb (8/17)\par On Zometa q6mos\par +FH Br Ca (M. Gr Aunt 38)\par BRCA ([facility])(8/16): -\par +c/o bilat trigger finger in thumbs (R>L) > s/p steroid inj R and L\par S/P L and R thumb surgery for trigger finger release\par S/P L Mx Site revision/fat injection (9/18)([facility])\par Colonoscopy(1/23): "WNL" > 10yrs \par PAP/Pelvic (10/22): +HPV > +Cx Bx > "OK" > F/U PAP (4/23) > pt did NOT get > discussed \par Got Pfizer bivalent (10/22)\par No other MH/FH changes. ROS reviewed/discussed. Taking Vit D. Bone Densitometry (7/17): WNL

## 2023-07-05 NOTE — PHYSICAL EXAM
[Normocephalic] : normocephalic [Atraumatic] : atraumatic [Supple] : supple [No Supraclavicular Adenopathy] : no supraclavicular adenopathy [No Cervical Adenopathy] : no cervical adenopathy [No Thyromegaly] : no thyromegaly [Normal Sinus Rhythm] : normal sinus rhythm [Examined in the supine and seated position] : examined in the supine and seated position [No dominant masses] : no dominant masses in right breast  [No dominant masses] : no dominant masses left breast [No Nipple Retraction] : no left nipple retraction [No Nipple Discharge] : no left nipple discharge [No Axillary Lymphadenopathy] : no left axillary lymphadenopathy [No Edema] : no edema [No Rashes] : no rashes [No Ulceration] : no ulceration [de-identified] : S/P NSMX/Ax/PMRT/Implt w/o rec. %. No lymphedema. \par +focal implt outpouching R4:00 near IMF > site of pt concern\par +caps contr\par   [de-identified] : S/P NSMX/Implant w/o susp fx's. %. No lymphedema

## 2023-07-14 ENCOUNTER — APPOINTMENT (OUTPATIENT)
Dept: PLASTIC SURGERY | Facility: HOSPITAL | Age: 58
End: 2023-07-14
Payer: MEDICAID

## 2023-07-14 VITALS
HEART RATE: 76 BPM | DIASTOLIC BLOOD PRESSURE: 79 MMHG | SYSTOLIC BLOOD PRESSURE: 128 MMHG | TEMPERATURE: 97.8 F | OXYGEN SATURATION: 98 %

## 2023-07-14 PROCEDURE — 99214 OFFICE O/P EST MOD 30 MIN: CPT

## 2023-07-14 NOTE — PHYSICAL EXAM
[NI] : Normal [de-identified] : Status post bilateral mastectomy and implant reconstruction, right side has a grade 3 contracture and the left side is softer and lower with rippling laterally there is a new fold palpable and visible at the imf 5:00 r breast no mass

## 2023-07-14 NOTE — ASSESSMENT
[FreeTextEntry1] : RAKESH reassured and will monitor for thinning of the skin over this new fold.  RAKESH is advised to refrain from building chest muscle because of the tension created on the implant All of RAKESH 's questions and concerns were addressed and answered completely The instructions were reviewed in detail with RAKESH. RAKESH will return to the office for a post procedure visit for regular annual visit 6 months

## 2023-07-14 NOTE — HISTORY OF PRESENT ILLNESS
[FreeTextEntry1] : RAKESH returns earlier than annual visit per oncologist and oncologic surgeon to evaluate new area r breast.  RAKESH states  she started chest exercises and developed a knuckle under the r breast at the [facility].  RAKESH denies other changes and is here for evaluation of the implant.  All of RAKESH 's concerns were addressed and answered completely. RAKESH is going to stop doing vigorous chest exercises because she has an existing radiation induced contracture and the new pectoralis exertion and muscle mass is causing excess tension on the implant  .  We discussed signs of impending extrusion such as redness or translucent skin appearance and currently there is no concern for that.  Other than the small fold in the implant at 5:00 at the [facility] there is no interval change in the grade 3 contracture

## 2023-09-11 ENCOUNTER — APPOINTMENT (OUTPATIENT)
Dept: BREAST CENTER | Facility: CLINIC | Age: 58
End: 2023-09-11

## 2023-11-14 ENCOUNTER — APPOINTMENT (OUTPATIENT)
Dept: PLASTIC SURGERY | Facility: CLINIC | Age: 58
End: 2023-11-14

## 2023-12-12 ENCOUNTER — APPOINTMENT (OUTPATIENT)
Dept: OBGYN | Facility: CLINIC | Age: 58
End: 2023-12-12

## 2023-12-12 ENCOUNTER — APPOINTMENT (OUTPATIENT)
Dept: PLASTIC SURGERY | Facility: CLINIC | Age: 58
End: 2023-12-12

## 2024-01-11 ENCOUNTER — APPOINTMENT (OUTPATIENT)
Dept: BREAST CENTER | Facility: CLINIC | Age: 59
End: 2024-01-11
Payer: MEDICAID

## 2024-01-11 VITALS — BODY MASS INDEX: 24.66 KG/M2 | WEIGHT: 148 LBS | HEIGHT: 65 IN

## 2024-01-11 DIAGNOSIS — C50.919 MALIGNANT NEOPLASM OF UNSPECIFIED SITE OF UNSPECIFIED FEMALE BREAST: ICD-10-CM

## 2024-01-11 DIAGNOSIS — Z92.21 PERSONAL HISTORY OF ANTINEOPLASTIC CHEMOTHERAPY: ICD-10-CM

## 2024-01-11 DIAGNOSIS — Z98.82 BREAST IMPLANT STATUS: ICD-10-CM

## 2024-01-11 DIAGNOSIS — C50.911 MALIGNANT NEOPLASM OF UNSPECIFIED SITE OF RIGHT FEMALE BREAST: ICD-10-CM

## 2024-01-11 DIAGNOSIS — R87.629 UNSPECIFIED ABNORMAL CYTOLOGICAL FINDINGS IN SPECIMENS FROM VAGINA: ICD-10-CM

## 2024-01-11 DIAGNOSIS — C77.3 MALIGNANT NEOPLASM OF UNSPECIFIED SITE OF UNSPECIFIED FEMALE BREAST: ICD-10-CM

## 2024-01-11 DIAGNOSIS — T85.44XA CAPSULAR CONTRACTURE OF BREAST IMPLANT, INITIAL ENCOUNTER: ICD-10-CM

## 2024-01-11 DIAGNOSIS — Z92.3 PERSONAL HISTORY OF IRRADIATION: ICD-10-CM

## 2024-01-11 PROCEDURE — 99214 OFFICE O/P EST MOD 30 MIN: CPT

## 2024-01-11 RX ORDER — TRIAMCINOLONE ACETONIDE 1 MG/G
0.1 OINTMENT TOPICAL
Qty: 80 | Refills: 0 | Status: DISCONTINUED | COMMUNITY
Start: 2023-02-02 | End: 2024-01-11

## 2024-01-11 RX ORDER — NEOMYCIN AND POLYMYXIN B SULFATES AND DEXAMETHASONE 3.5; 10000; 1 MG/G; [IU]/G; MG/G
3.5-10000-0.1 OINTMENT OPHTHALMIC
Qty: 4 | Refills: 0 | Status: DISCONTINUED | COMMUNITY
Start: 2023-01-27 | End: 2024-01-11

## 2024-01-11 NOTE — HISTORY OF PRESENT ILLNESS
[FreeTextEntry1] : Pt returns early w/ c/o new focal prominence R inf Br SD 1 wk ago. Pt has been doing upper body stretching exercises  S/P Bilat NSMX/ R SLN/ R Ax/TE's ([facility])(10/10/16): R: +ILCA x 2 sites (2.5cm, 0.8cm), +LVI, +3/10 LN, +extranodal extension, -margins, ER+, [place]+, Her2 1+, Ki67 29%/16%, L: No Ca/atypia R Stage IIB (T2N1M0) ILCA Completed chemo (AC/T)([facility])(3/17) S/P R PMRT ([facility]) S/P Bilat Implt Xchg (textured > NOT Allerghan) (1/12/18)([facility]) S/P L SIP (11/14/16)([facility]) > d/c'ed (4/17) Started Arimidex/Lupron/ASAb (8/17) On Zometa q6mos +FH Br Ca (M. Gr Aunt 38) BRCA ([facility])(8/16): - +c/o bilat trigger finger in thumbs (R>L) > s/p steroid inj R and L S/P L and R thumb surgery for trigger finger release S/P L Mx Site revision/fat injection (9/18)([facility]) Colonoscopy(1/23): "WNL" > 10yrs  PAP/Pelvic (10/22): +HPV > +Cx Bx > "OK" > F/U PAP rec'ed Got Pfizer bivalent (10/22) No other MH/FH changes. ROS reviewed/discussed. Taking Vit D. Bone Densitometry (7/17): WNL

## 2024-01-11 NOTE — PHYSICAL EXAM
[Normocephalic] : normocephalic [Atraumatic] : atraumatic [Supple] : supple [No Supraclavicular Adenopathy] : no supraclavicular adenopathy [No Cervical Adenopathy] : no cervical adenopathy [No Thyromegaly] : no thyromegaly [Normal Sinus Rhythm] : normal sinus rhythm [Examined in the supine and seated position] : examined in the supine and seated position [No dominant masses] : no dominant masses in right breast  [No dominant masses] : no dominant masses left breast [No Nipple Retraction] : no left nipple retraction [No Nipple Discharge] : no left nipple discharge [No Axillary Lymphadenopathy] : no left axillary lymphadenopathy [No Edema] : no edema [No Rashes] : no rashes [No Ulceration] : no ulceration [de-identified] : S/P NSMX/Ax/PMRT/Implt w/o rec. %. No lymphedema.  +caps contr   [de-identified] : S/P NSMX/Implant w/o susp fx's. %. No lymphedema

## 2024-07-25 ENCOUNTER — APPOINTMENT (OUTPATIENT)
Dept: BREAST CENTER | Facility: CLINIC | Age: 59
End: 2024-07-25
Payer: MEDICAID

## 2024-07-25 VITALS — BODY MASS INDEX: 22.49 KG/M2 | WEIGHT: 135 LBS | HEIGHT: 65 IN

## 2024-07-25 VITALS — OXYGEN SATURATION: 100 % | DIASTOLIC BLOOD PRESSURE: 73 MMHG | SYSTOLIC BLOOD PRESSURE: 104 MMHG | HEART RATE: 60 BPM

## 2024-07-25 DIAGNOSIS — Z92.21 PERSONAL HISTORY OF ANTINEOPLASTIC CHEMOTHERAPY: ICD-10-CM

## 2024-07-25 DIAGNOSIS — Z92.3 PERSONAL HISTORY OF IRRADIATION: ICD-10-CM

## 2024-07-25 DIAGNOSIS — C50.911 MALIGNANT NEOPLASM OF UNSPECIFIED SITE OF RIGHT FEMALE BREAST: ICD-10-CM

## 2024-07-25 DIAGNOSIS — T85.44XA CAPSULAR CONTRACTURE OF BREAST IMPLANT, INITIAL ENCOUNTER: ICD-10-CM

## 2024-07-25 DIAGNOSIS — Z98.82 BREAST IMPLANT STATUS: ICD-10-CM

## 2024-07-25 DIAGNOSIS — C77.3 MALIGNANT NEOPLASM OF UNSPECIFIED SITE OF UNSPECIFIED FEMALE BREAST: ICD-10-CM

## 2024-07-25 DIAGNOSIS — C50.919 MALIGNANT NEOPLASM OF UNSPECIFIED SITE OF UNSPECIFIED FEMALE BREAST: ICD-10-CM

## 2024-07-25 PROCEDURE — 99213 OFFICE O/P EST LOW 20 MIN: CPT

## 2024-07-25 NOTE — HISTORY OF PRESENT ILLNESS
[FreeTextEntry1] : Pt returns early w/ c/o new focal prominence R inf Br SD 1 wk ago. Pt has been doing upper body stretching exercises  S/P Bilat NSMX/ R SLN/ R Ax/TE's ([facility])(10/10/16): R: +ILCA x 2 sites (2.5cm, 0.8cm), +LVI, +3/10 LN, +extranodal extension, -margins, ER+, [place]+, Her2 1+, Ki67 29%/16%, L: No Ca/atypia R Stage IIB (T2N1M0) ILCA Completed chemo (AC/T)([facility])(3/17) S/P R PMRT ([facility]) S/P Bilat Implt Xchg (textured > NOT Allerghan) (1/12/18)([facility]) S/P L SIP (11/14/16)([facility]) > d/c'ed (4/17) Started Arimidex/Lupron/ASAb (8/17) On Zometa q6mos +FH Br Ca (M. Gr Aunt 38) BRCA ([facility])(8/16): - +c/o bilat trigger finger in thumbs (R>L) > s/p steroid inj R and L S/P L and R thumb surgery for trigger finger release S/P L Mx Site revision/fat injection (9/18)([facility]) Colonoscopy(1/23): "WNL" > 10yrs  PAP/Pelvic (10/22): +HPV > +Cx Bx > "OK" > F/U PAP rec'ed Got Pfizer bivalent (10/22) No other MH/FH changes. ROS reviewed/discussed. Taking Vit D. Bone Densitometry (7/17): WNL

## 2024-07-25 NOTE — PHYSICAL EXAM
[Normocephalic] : normocephalic [Atraumatic] : atraumatic [Supple] : supple [No Supraclavicular Adenopathy] : no supraclavicular adenopathy [No Cervical Adenopathy] : no cervical adenopathy [No Thyromegaly] : no thyromegaly [Normal Sinus Rhythm] : normal sinus rhythm [Examined in the supine and seated position] : examined in the supine and seated position [No dominant masses] : no dominant masses in right breast  [No dominant masses] : no dominant masses left breast [No Nipple Retraction] : no left nipple retraction [No Nipple Discharge] : no left nipple discharge [No Axillary Lymphadenopathy] : no left axillary lymphadenopathy [No Edema] : no edema [No Rashes] : no rashes [No Ulceration] : no ulceration [de-identified] : S/P NSMX/Ax/PMRT/Implt w/o rec. %. No lymphedema.    [de-identified] : S/P NSMX/Implant w/o susp fx's. %. No lymphedema

## 2025-01-30 ENCOUNTER — APPOINTMENT (OUTPATIENT)
Dept: BREAST CENTER | Facility: CLINIC | Age: 60
End: 2025-01-30
Payer: MEDICAID

## 2025-01-30 VITALS
SYSTOLIC BLOOD PRESSURE: 115 MMHG | BODY MASS INDEX: 26.78 KG/M2 | WEIGHT: 140 LBS | HEART RATE: 80 BPM | DIASTOLIC BLOOD PRESSURE: 73 MMHG | HEIGHT: 60.54 IN

## 2025-01-30 DIAGNOSIS — C77.3 MALIGNANT NEOPLASM OF UNSPECIFIED SITE OF UNSPECIFIED FEMALE BREAST: ICD-10-CM

## 2025-01-30 DIAGNOSIS — Z92.21 PERSONAL HISTORY OF ANTINEOPLASTIC CHEMOTHERAPY: ICD-10-CM

## 2025-01-30 DIAGNOSIS — Z98.82 BREAST IMPLANT STATUS: ICD-10-CM

## 2025-01-30 DIAGNOSIS — C50.911 MALIGNANT NEOPLASM OF UNSPECIFIED SITE OF RIGHT FEMALE BREAST: ICD-10-CM

## 2025-01-30 DIAGNOSIS — C50.919 MALIGNANT NEOPLASM OF UNSPECIFIED SITE OF UNSPECIFIED FEMALE BREAST: ICD-10-CM

## 2025-01-30 DIAGNOSIS — T85.44XA CAPSULAR CONTRACTURE OF BREAST IMPLANT, INITIAL ENCOUNTER: ICD-10-CM

## 2025-01-30 DIAGNOSIS — Z92.3 PERSONAL HISTORY OF IRRADIATION: ICD-10-CM

## 2025-01-30 PROCEDURE — 99213 OFFICE O/P EST LOW 20 MIN: CPT

## 2025-07-29 ENCOUNTER — APPOINTMENT (OUTPATIENT)
Dept: BREAST CENTER | Facility: CLINIC | Age: 60
End: 2025-07-29
Payer: MEDICAID

## 2025-07-29 VITALS
HEIGHT: 65 IN | BODY MASS INDEX: 23.32 KG/M2 | SYSTOLIC BLOOD PRESSURE: 106 MMHG | WEIGHT: 140 LBS | DIASTOLIC BLOOD PRESSURE: 65 MMHG | HEART RATE: 75 BPM

## 2025-07-29 DIAGNOSIS — C77.3 MALIGNANT NEOPLASM OF UNSPECIFIED SITE OF UNSPECIFIED FEMALE BREAST: ICD-10-CM

## 2025-07-29 DIAGNOSIS — Z92.3 PERSONAL HISTORY OF IRRADIATION: ICD-10-CM

## 2025-07-29 DIAGNOSIS — C50.911 MALIGNANT NEOPLASM OF UNSPECIFIED SITE OF RIGHT FEMALE BREAST: ICD-10-CM

## 2025-07-29 DIAGNOSIS — Z92.21 PERSONAL HISTORY OF ANTINEOPLASTIC CHEMOTHERAPY: ICD-10-CM

## 2025-07-29 DIAGNOSIS — T85.44XA CAPSULAR CONTRACTURE OF BREAST IMPLANT, INITIAL ENCOUNTER: ICD-10-CM

## 2025-07-29 DIAGNOSIS — Z98.82 BREAST IMPLANT STATUS: ICD-10-CM

## 2025-07-29 DIAGNOSIS — C50.919 MALIGNANT NEOPLASM OF UNSPECIFIED SITE OF UNSPECIFIED FEMALE BREAST: ICD-10-CM

## 2025-07-29 PROCEDURE — 99213 OFFICE O/P EST LOW 20 MIN: CPT
